# Patient Record
Sex: FEMALE | Race: WHITE | NOT HISPANIC OR LATINO | URBAN - METROPOLITAN AREA
[De-identification: names, ages, dates, MRNs, and addresses within clinical notes are randomized per-mention and may not be internally consistent; named-entity substitution may affect disease eponyms.]

---

## 2017-08-18 ENCOUNTER — EMERGENCY (EMERGENCY)
Facility: HOSPITAL | Age: 22
LOS: 1 days | Discharge: HOME | End: 2017-08-18
Attending: EMERGENCY MEDICINE | Admitting: EMERGENCY MEDICINE
Payer: COMMERCIAL

## 2017-08-18 VITALS
DIASTOLIC BLOOD PRESSURE: 106 MMHG | RESPIRATION RATE: 20 BRPM | HEART RATE: 130 BPM | SYSTOLIC BLOOD PRESSURE: 137 MMHG | OXYGEN SATURATION: 98 % | TEMPERATURE: 98 F

## 2017-08-18 DIAGNOSIS — R41.82 ALTERED MENTAL STATUS, UNSPECIFIED: ICD-10-CM

## 2017-08-18 DIAGNOSIS — F10.129 ALCOHOL ABUSE WITH INTOXICATION, UNSPECIFIED: ICD-10-CM

## 2017-08-18 PROCEDURE — 99283 EMERGENCY DEPT VISIT LOW MDM: CPT | Mod: 25

## 2017-08-18 PROCEDURE — 99285 EMERGENCY DEPT VISIT HI MDM: CPT
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
## 2017-08-19 VITALS
RESPIRATION RATE: 18 BRPM | OXYGEN SATURATION: 98 % | DIASTOLIC BLOOD PRESSURE: 78 MMHG | TEMPERATURE: 98 F | SYSTOLIC BLOOD PRESSURE: 120 MMHG | HEART RATE: 109 BPM
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
## 2017-08-19 NOTE — ED PROVIDER NOTE - SHIFT CHANGE DETAILS
Pt s/o pending sobriety. pt reassess at 3:50am. Awake, alert, but not clinically sober yet. No focal neuro deficits. Will continue to observe until clinically sober and safe for DC.

## 2017-08-19 NOTE — ED ADULT NURSE REASSESSMENT NOTE - NS ED NURSE REASSESS COMMENT FT1
Patient awake oriented, VS stable, ambulating with steady gait, able to answer questions appropriately, in NAD, on phone texting back and forth with friends.

## 2017-08-19 NOTE — ED ADULT NURSE REASSESSMENT NOTE - NS ED NURSE REASSESS COMMENT FT1
Diazepam IM is held due to the pt is calm and asleep. MD is informed and agreed Diazepam IM is held due to the pt is calm and asleep. Chandana HENRIQUEZ is informed and agreed